# Patient Record
Sex: FEMALE
[De-identification: names, ages, dates, MRNs, and addresses within clinical notes are randomized per-mention and may not be internally consistent; named-entity substitution may affect disease eponyms.]

---

## 2019-10-14 ENCOUNTER — APPOINTMENT (OUTPATIENT)
Dept: OTOLARYNGOLOGY | Facility: CLINIC | Age: 59
End: 2019-10-14
Payer: COMMERCIAL

## 2019-10-14 VITALS
BODY MASS INDEX: 20.16 KG/M2 | HEIGHT: 68 IN | HEART RATE: 69 BPM | DIASTOLIC BLOOD PRESSURE: 68 MMHG | WEIGHT: 133 LBS | SYSTOLIC BLOOD PRESSURE: 105 MMHG

## 2019-10-14 DIAGNOSIS — H61.23 IMPACTED CERUMEN, BILATERAL: ICD-10-CM

## 2019-10-14 DIAGNOSIS — Z78.9 OTHER SPECIFIED HEALTH STATUS: ICD-10-CM

## 2019-10-14 DIAGNOSIS — Z87.09 PERSONAL HISTORY OF OTHER DISEASES OF THE RESPIRATORY SYSTEM: ICD-10-CM

## 2019-10-14 DIAGNOSIS — Z82.3 FAMILY HISTORY OF STROKE: ICD-10-CM

## 2019-10-14 PROBLEM — Z00.00 ENCOUNTER FOR PREVENTIVE HEALTH EXAMINATION: Status: ACTIVE | Noted: 2019-10-14

## 2019-10-14 PROCEDURE — 99203 OFFICE O/P NEW LOW 30 MIN: CPT | Mod: 25

## 2019-10-14 PROCEDURE — 69210 REMOVE IMPACTED EAR WAX UNI: CPT

## 2019-10-14 PROCEDURE — 31231 NASAL ENDOSCOPY DX: CPT

## 2019-10-14 PROCEDURE — 92557 COMPREHENSIVE HEARING TEST: CPT

## 2019-10-14 PROCEDURE — 92567 TYMPANOMETRY: CPT

## 2019-10-14 RX ORDER — FLUTICASONE PROPIONATE 50 MCG
50 SPRAY, SUSPENSION NASAL
Refills: 0 | Status: ACTIVE | COMMUNITY

## 2019-10-14 RX ORDER — LORATADINE 5 MG/5 ML
SOLUTION, ORAL ORAL
Refills: 0 | Status: ACTIVE | COMMUNITY

## 2019-10-14 NOTE — HISTORY OF PRESENT ILLNESS
[de-identified] : JUDITH MELENDEZ is a 59 year patient Has cerumen impaction and dizziness. She was found to have cerumen impaction on her annual exam. She has no otalgia, otorrhea, or tinnitus. She did have labyrinthitis in August.. She had an intermittent spinning sensation which lasted several seconds. It may have been positional. It has improved. She has had 1-2 episodes of dizziness over the years.  She denies a history of recurrent ear infections, prior otologic surgery, or head/ear trauma. She does have some noise exposure from music. There is also family history of age-related hearing loss. She has not had a recent audiogram. She does suffer from migraines but has not had many headaches recently.  She also has a history of allergies and chronic rhinosinusitis. She has nasal congestion and sinus pressure which are worse on the right side. She does not get recurrent infections. She did undergo allergy work up in the past and had imaging studies.  She has a cat at home. She does not smoke.  She uses flonase. 
yes

## 2019-10-14 NOTE — CONSULT LETTER
[Dear  ___] : Dear  [unfilled], [Please see my note below.] : Please see my note below. [Consult Letter:] : I had the pleasure of evaluating your patient, [unfilled]. [Consult Closing:] : Thank you very much for allowing me to participate in the care of this patient.  If you have any questions, please do not hesitate to contact me. [Sincerely,] : Sincerely, [FreeTextEntry3] : Rosie Cottrell MD\par

## 2019-10-14 NOTE — ASSESSMENT
[FreeTextEntry1] : She had dizziness and vertigo which started in August. It has improved significantly.  It may have been positional vertigo or labyrinthitis.  We discussed other possible etiologies such as atypical migraines.  She had cerumen impaction bilaterally which was removed. Her ears were otherwise normal on exam. Her audiogram was also normal.  \par \par SHe has a history of allergies and chronic rhinitis.  She has a deviated nasal septum and reflux related laryngeal changes on nasal endoscopy/flexible laryngoscopy.  The maxillary sinus walls were also mildly lateralized.  \par \par PLAN\par \par -findings and management options discussed in detail with the patient. \par -good aural hygiene\par -avoid using cotton swabs in the ears\par -noise precautions\par -annual audiogram.\par -ABR, VNG, Ecochg to evaluate for peripheral vestibulopathy if she has recurrent dizziness.  She is going to hold off on testing since she is feeling a lot better. \par -neurology evaluation and MRI with/without contrast to rule out retrocochlear pathology if she has recurrent dizziness. \par -vestibular therapy and/or home exercises\par -allergy precautions\par -consider repeat allergy evaluation\par -nasal steroid spray and antihistamines as needed\par -consider sinus CT scan. She is going to see if she has a copy of a prior scan report at home. She will send it to me. \par -reflux precautions and OTC medication for reflux as needed\par -follow up in 6 months if she is doing well.  call and return earlier if she has recurrent dizziness, sinus symptoms or if she wishes to proceed with further work up of her sinus disease. \par -call and return earlier if any concerns. \par \par

## 2020-06-09 ENCOUNTER — TRANSCRIPTION ENCOUNTER (OUTPATIENT)
Age: 60
End: 2020-06-09

## 2020-06-15 ENCOUNTER — APPOINTMENT (OUTPATIENT)
Dept: CT IMAGING | Facility: CLINIC | Age: 60
End: 2020-06-15

## 2024-02-01 ENCOUNTER — APPOINTMENT (OUTPATIENT)
Dept: OTOLARYNGOLOGY | Facility: CLINIC | Age: 64
End: 2024-02-01
Payer: COMMERCIAL

## 2024-02-01 VITALS — WEIGHT: 133 LBS | BODY MASS INDEX: 20.16 KG/M2 | HEIGHT: 68 IN

## 2024-02-01 DIAGNOSIS — Z91.09 OTHER ALLERGY STATUS, OTHER THAN TO DRUGS AND BIOLOGICAL SUBSTANCES: ICD-10-CM

## 2024-02-01 PROCEDURE — 99203 OFFICE O/P NEW LOW 30 MIN: CPT

## 2024-02-01 RX ORDER — FLUTICASONE PROPIONATE 50 MCG
SPRAY, SUSPENSION NASAL
Refills: 0 | Status: ACTIVE | COMMUNITY

## 2024-02-01 RX ORDER — DOXYCYCLINE 100 MG/1
100 CAPSULE ORAL TWICE DAILY
Qty: 6 | Refills: 0 | Status: ACTIVE | COMMUNITY
Start: 2024-02-01 | End: 1900-01-01

## 2024-02-01 RX ORDER — DOXYCYCLINE HYCLATE 50 MG/1
CAPSULE ORAL
Refills: 0 | Status: ACTIVE | COMMUNITY

## 2024-02-01 NOTE — ASSESSMENT
[FreeTextEntry1] : She has a history of chronic rhinosinusitis.  She developed a migraine about 2 weeks ago.  She still has sinus pressure with nasal congestion obstruction.  She has been on Flonase, decongestant, and doxycycline.  She may be somewhat better.  She also has left posterior neck/occipital pain.  On nasal endoscopy, there is no purulent drainage or congestion.  She did have mild mucosal thickening on prior CT scan from several years ago.  Plan -Findings and management options were discussed with the patient. -I recommended nasal saline irrigations and a decongestant as needed -Continue Flonase -She will finish a 10-day course of doxycycline -I spoke with her about further workup such as CT scan of her sinuses and/or neurology evaluation.  She is going to think this over -I recommended reflux precautions.  She was given literature.  She may take OTC Pepcid.  She did mention she has occasional dysphagia.  I recommended GI evaluation.  She may also consider modified barium swallow -I have asked her to call me in 1 to 2 weeks to let me know how she is feeling.  She will consider proceeding with the CT scan of the sinuses.  We will decide on follow-up depending on how she is doing.  She should call me prior to that if she has any concerns or worsening symptoms

## 2024-02-01 NOTE — HISTORY OF PRESENT ILLNESS
[de-identified] : JUDITH MELENDEZ is a 63 year old patient Here for possible sinusitis.  She said that she developed a migraine approximately 2 weeks ago.  She then developed sinus pressure, sinus pain bilaterally, nasal congestion and nasal obstruction.  She also has left occipital/posterior neck pain.  She did not have symptoms of an upper respiratory tract infection initially.  She has been on Flonase, decongestant, and doxycycline.  She is feeling somewhat better but still has symptoms.  She has a history of allergies.  She had testing with Dr. Alonzo in the past She had a CT scan in 2006 with Dr. Jones.  She had a copy of the report.  It reported a deviated septum to the right curving to the left, bilateral turbinate hypertrophy, frontal sinus mucosal thickening with patent frontoethmoidal recesses, circumferential mucosal thickening within the maxillary sinus with narrowing of the OMU's, and scattered ethmoid sinus mucosal thickening septoplasty was recommended She was seen in 2019 for dizziness/vertigo.  She has had intermittent dizziness in the past.  She said it has been better No history of recurrent ear infections, prior otologic surgery or ear trauma. Audiogram showed normal hearing

## 2024-02-01 NOTE — PHYSICAL EXAM
[TextEntry] : PHYSICAL EXAM  General: The patient was alert, oriented and in no distress. Voice was clear.  Face: The patient had no facial asymmetry or mass. The skin was unremarkable.  Ears: Hearing normal to conversational voice External ears were normal without deformity. Ear canals- cerumen impaction  PROCEDURE- EAR MICROSCOPY AND CERUMEN REMOVAL  Indication: cerumen removal  Under the microscope, obstructing cerumen was removed atraumatically from the both ears with a suction, curette and/or forceps.  Canals: normal. no inflammation.  Tympanic membranes: Intact. no perforation or effusion.  Nose:  The external nose had no significant deformity.     On anterior rhinoscopy, the nasal mucosa was clear.   The anterior septum was grossly midline. There were no visualized polyps, purulence  or masses.   Oral cavity: Oral mucosa- normal. Oral and base of tongue- clear and without mass. Gingival and buccal mucosa- moist and without lesions. Palate- the palate moved well. There was no cleft palate. There appeared to be good salivary flow.   Oral cavity/oropharynx- no pus, erythema or mass  Neck:  The neck was symmetrical. The parotid and submandibular glands were normal without masses. The trachea was midline and there was no unusual crepitus. Thyroid was smooth and nontender and no masses were palpated. No masses  Lymphatics: Cervical adenopathy- none.    PROCEDURE:  FLEXIBLE LARYNGOSCOPY, NASAL ENDOSCOPY   Surgeon: Dr. Cottrell Indication: Chronic rhinitis, chronic sinusitis, assess for infection, inadequate exam on anterior rhinoscopy Anesthetic: Topical lidocaine and Afrin Procedure: The patient was placed in a sitting position.  Following application of the topical anesthetic and decongestant, exam was performed with a flexible telescope.  The scope was passed along the right nasal floor to the nasopharynx.  It was then passed into the region of the middle meatus, middle turbinate, and sphenoethmoid region.  An identical procedure was performed on the left side.  The following findings were noted:  Nasal mucosa: Mild edema Septum: Deviated bilaterally Right nasal cavity      Inferior turbinate: Hypertrophic      Middle turbinate: normal      Superior turbinate: normal      Inferior meatus: no pus, polyps or congestion      Middle meatus:  no pus, polyps or congestion       Superior meatus:  no pus, polyps, or congestion      Sphenoethmoidal recess: no pus, polyps or congestion  Left nasal cavity      Inferior turbinate: Hypertrophic      Middle turbinate: normal      Superior turbinate: normal      Inferior meatus: no pus, polyps or congestion      Middle meatus: no pus, polyps, or congestion      Superior meatus:  no pus, polyps, or congestion      Sphenoethmoidal recess: no pus, polyps or congestion   Nasopharynx: no masses, choanae patent, no adenoid tissue  Base of tongue and vallecula: no masses or asymmetry Posterior pharyngeal wall: no masses.  Hypopharynx: symmetrical. No masses Pyriform sinuses: no lesions or pooling of secretions Epiglottis: normal. No edema or lesions Aryepiglottic folds: normal. No lesions.  True vocal cords: clear and mobile. No lesions. Airway patent False vocal cords: normal Ventricles: no masses.  Arytenoids: Normal Interarytenoid area: no masses.  Severe edema Subglottis: normal. no masses

## 2024-02-01 NOTE — CONSULT LETTER
[Dear  ___] : Dear  [unfilled], [Courtesy Letter:] : I had the pleasure of seeing your patient, [unfilled], in my office today. [Please see my note below.] : Please see my note below. [Consult Closing:] : Thank you very much for allowing me to participate in the care of this patient.  If you have any questions, please do not hesitate to contact me. [Sincerely,] : Sincerely, [FreeTextEntry3] : Rosie Cottrell MD [facility] at [facility]  Head & Neck Surgery [facility]   Department of Otolaryngology [facility] [employer] [facility] at [facility]  Office Tel: [phone]

## 2024-02-08 ENCOUNTER — APPOINTMENT (OUTPATIENT)
Dept: OTOLARYNGOLOGY | Facility: CLINIC | Age: 64
End: 2024-02-08
Payer: COMMERCIAL

## 2024-02-08 DIAGNOSIS — J32.8 OTHER CHRONIC SINUSITIS: ICD-10-CM

## 2024-02-08 PROCEDURE — 95992 CANALITH REPOSITIONING PROC: CPT | Mod: LT

## 2024-02-08 PROCEDURE — 92567 TYMPANOMETRY: CPT

## 2024-02-08 PROCEDURE — 99213 OFFICE O/P EST LOW 20 MIN: CPT

## 2024-02-08 PROCEDURE — 92557 COMPREHENSIVE HEARING TEST: CPT

## 2024-02-08 NOTE — ASSESSMENT
[FreeTextEntry1] : She continues to have sinus pressure with nasal congestion and posterior neck/occipital pain.  She did not not notice improvement on doxycycline.  She may have cervical vertigo.  She does have muscle tension in the posterior neck.  She also had a migraine shortly before the sinus symptoms.  All of these conditions could be causing her symptoms  She has had dizziness.  She may have benign positional vertigo.  Madison-Hallpike testing was positive to the left and minimally positive to the right.  Epley maneuver was performed for left benign positional vertigo.  Plan -Findings and management options were discussed with the patient. -Nasal saline irrigations, decongestant, and Flonase as needed -We discussed sending her for CT scan of the sinuses.  I have given her the referral.  She is going to see the neurologist on Monday.  Will see if they recommend an MRI.  If they do, we may hold off on the CT scan. -She may try massage, warm compresses or heating pad and see if that helps with the discomfort of the neck -Post Epley instructions given to the patient -I recommend home exercises and vestibular therapy evaluation if she has persistent dizziness -I will see her back after the CAT scan.  I have also asked her to call me with an update after the neurology evaluation -She should call and return earlier if any concerns or worsening symptoms

## 2024-02-08 NOTE — HISTORY OF PRESENT ILLNESS
[de-identified] : JUDITH MELENDEZ is a 63 year old patient Here for follow-up for sinusitis.  She was seen last week.  She was on doxycycline which she has since finished.  She has mild nasal congestion but no nasal obstruction or drainage.  She does have right-sided head pressure and continued posterior neck/occipital pain.  She has an appointment to see a neurologist on Monday.  She is using a decongestant and nasal steroid spray.  She said that she did nasal saline irrigation.  She then developed vertigo which is likely positional.  She tried the Epley maneuver at home.  She has mild disequilibrium.  She has no tinnitus but does have ear pressure/eustachian tube dysfunction.   ENT history She has a history of allergies. She had testing with Dr. Alonzo in the past She had a CT scan in 2006 with Dr. Jones. She had a copy of the report. It reported a deviated septum to the right curving to the left, bilateral turbinate hypertrophy, frontal sinus mucosal thickening with patent frontoethmoidal recesses, circumferential mucosal thickening within the maxillary sinus with narrowing of the OMU's, and scattered ethmoid sinus mucosal thickening septoplasty was recommended She was seen in 2019 for dizziness/vertigo. She has had intermittent dizziness in the past. She said it has been better No history of recurrent ear infections, prior otologic surgery or ear trauma.

## 2024-02-08 NOTE — PHYSICAL EXAM
[TextEntry] : PHYSICAL EXAM  General: The patient was alert, oriented and in no distress. Voice was clear.  Face: The patient had no facial asymmetry or mass. The skin was unremarkable.  Ears: Hearing normal to conversational voice External ears were normal without deformity. Ear canals were clear. No cerumen or inflammation Tympanic membranes were intact and normal. No perforation or effusion. mobile  Nose:  The external nose had no significant deformity.     On anterior rhinoscopy, the nasal mucosa was clear.   The anterior septum was grossly midline. There were no visualized polyps, purulence  or masses.   Oral cavity: Oral mucosa- normal. Oral and base of tongue- clear and without mass. Gingival and buccal mucosa- moist and without lesions. Palate- the palate moved well. There was no cleft palate. There appeared to be good salivary flow.   Oral cavity/oropharynx- no pus, erythema or mass  Neck:  The neck was symmetrical. The parotid and submandibular glands were normal without masses. The trachea was midline and there was no unusual crepitus. Thyroid was smooth and nontender and no masses were palpated. No masses  Lymphatics: Cervical adenopathy- none.    AUDIOGRAM- test ordered and the results reviewed and discussed with the patient. [] Hearing-normal Word recognition-normal Tympanograms- AD- type A, AS- type A   [facility] TESTING nystagmus: No obvious nystagmus subjective dizziness: Subjective dizziness with the head left position and mild dizziness with the head right position.  Epley maneuver was performed for left benign positional vertigo in the usual fashion. There were no complications

## 2024-02-17 ENCOUNTER — APPOINTMENT (OUTPATIENT)
Dept: CT IMAGING | Facility: CLINIC | Age: 64
End: 2024-02-17

## 2024-02-23 ENCOUNTER — APPOINTMENT (OUTPATIENT)
Dept: OTOLARYNGOLOGY | Facility: CLINIC | Age: 64
End: 2024-02-23
Payer: COMMERCIAL

## 2024-02-23 DIAGNOSIS — R42 DIZZINESS AND GIDDINESS: ICD-10-CM

## 2024-02-23 DIAGNOSIS — H90.3 SENSORINEURAL HEARING LOSS, BILATERAL: ICD-10-CM

## 2024-02-23 DIAGNOSIS — J31.0 CHRONIC RHINITIS: ICD-10-CM

## 2024-02-23 DIAGNOSIS — R51.9 HEADACHE, UNSPECIFIED: ICD-10-CM

## 2024-02-23 PROCEDURE — 99441: CPT | Mod: 93

## 2024-02-23 NOTE — HISTORY OF PRESENT ILLNESS
[Home] : at home, [unfilled] , at the time of the visit. [Medical Office: ([facility])___] : at the medical office located in  [de-identified] : JUDITH MELENDEZ is a 63 year old patient With a history of posterior neck/occipital pain, right head/sinus pressure, nasal congestion, and dizziness.  She said the dizziness has improved since we performed the Epley maneuver.  She did see the neurologist and was placed on prednisone.  She is feeling somewhat better.  She has light sensitivity.  She had an MRI of the brain with and without contrast.  I was able to review the report.  There was mild microvascular ischemic disease without acute infarct.  The sinuses were reported clear.  ENT history She has a history of allergies. She had testing with Dr. Alonzo in the past She had a CT scan in 2006 with Dr. Jones. She had a copy of the report. It reported a deviated septum to the right curving to the left, bilateral turbinate hypertrophy, frontal sinus mucosal thickening with patent frontoethmoidal recesses, circumferential mucosal thickening within the maxillary sinus with narrowing of the OMU's, and scattered ethmoid sinus mucosal thickening septoplasty was recommended She was seen in 2019 for dizziness/vertigo. She has had intermittent dizziness in the past. She said it has been better No history of recurrent ear infections, prior otologic surgery or ear trauma.

## 2024-02-23 NOTE — ASSESSMENT
[FreeTextEntry1] : She had dizziness and vertigo.  She was treated for benign positional vertigo with the Epley maneuver and it helped.  The dizziness has improved.  I did discuss other possible etiologies such as migraines.  She continues to have posterior neck/occipital pain with facial/sinus pressure and nasal congestion.  She saw the neurologist and had an MRI.  The sinuses were reportedly clear on the MRI.  The pain and pressure is likely not sinus related  Plan -Findings and management options were discussed with the patient. -Nasal saline irrigations, nasal steroid spray, and decongestant as needed -I recommended she follow-up with the neurologist for further management -She may consider vestibular therapy for dizziness -We will hold off on CT scan of the sinuses now.  If there are any concerns for sinus disease, we can send her for the scan -I have asked her to call me with an update after she speaks with the neurologist -If she is doing well, I will see her back in 3 months.  If she has any problems, she should return earlier

## 2024-03-15 ENCOUNTER — TRANSCRIPTION ENCOUNTER (OUTPATIENT)
Age: 64
End: 2024-03-15

## 2024-08-01 ENCOUNTER — APPOINTMENT (OUTPATIENT)
Dept: OTOLARYNGOLOGY | Facility: CLINIC | Age: 64
End: 2024-08-01

## 2025-02-12 NOTE — REASON FOR VISIT
[facility] Office called needing US order for Insurance Prior Authorization please fax # [phone] Paramjit Dodson   
Order sent    
[FreeTextEntry2] : Sinus pressure and dizziness